# Patient Record
Sex: FEMALE
[De-identification: names, ages, dates, MRNs, and addresses within clinical notes are randomized per-mention and may not be internally consistent; named-entity substitution may affect disease eponyms.]

---

## 2017-02-09 ENCOUNTER — HOSPITAL ENCOUNTER (OUTPATIENT)
Dept: HOSPITAL 53 - M SFHCCLAY | Age: 62
End: 2017-02-09
Attending: FAMILY MEDICINE
Payer: COMMERCIAL

## 2017-02-09 DIAGNOSIS — Z13.220: ICD-10-CM

## 2017-02-09 DIAGNOSIS — Z13.29: ICD-10-CM

## 2017-02-09 DIAGNOSIS — Z13.0: Primary | ICD-10-CM

## 2017-02-09 LAB
ALBUMIN SERPL BCG-MCNC: 3.8 GM/DL (ref 3.2–5.2)
ALBUMIN/GLOB SERPL: 1.06 {RATIO} (ref 1–1.93)
ALP SERPL-CCNC: 66 U/L (ref 45–117)
ALT SERPL W P-5'-P-CCNC: 26 U/L (ref 12–78)
ANION GAP SERPL CALC-SCNC: 10 MEQ/L (ref 8–16)
AST SERPL-CCNC: 18 U/L (ref 15–37)
BASOPHILS # BLD AUTO: 0.1 K/MM3 (ref 0–0.2)
BASOPHILS NFR BLD AUTO: 1.1 % (ref 0–1)
BILIRUB SERPL-MCNC: 0.6 MG/DL (ref 0.2–1)
BUN SERPL-MCNC: 15 MG/DL (ref 7–18)
CALCIUM SERPL-MCNC: 8.7 MG/DL (ref 8.8–10.2)
CHLORIDE SERPL-SCNC: 104 MEQ/L (ref 98–107)
CHOLEST SERPL-MCNC: 216 MG/DL (ref ?–200)
CO2 SERPL-SCNC: 28 MEQ/L (ref 21–32)
CREAT SERPL-MCNC: 0.75 MG/DL (ref 0.55–1.02)
EOSINOPHIL # BLD AUTO: 0.2 K/MM3 (ref 0–0.5)
EOSINOPHIL NFR BLD AUTO: 3.4 % (ref 0–3)
ERYTHROCYTE [DISTWIDTH] IN BLOOD BY AUTOMATED COUNT: 12 % (ref 11.5–14.5)
GFR SERPL CREATININE-BSD FRML MDRD: > 60 ML/MIN/{1.73_M2} (ref 45–?)
GLUCOSE SERPL-MCNC: 86 MG/DL (ref 80–110)
LARGE UNSTAINED CELL #: 0.2 K/MM3 (ref 0–0.4)
LARGE UNSTAINED CELL %: 2.9 % (ref 0–4)
LYMPHOCYTES # BLD AUTO: 1.6 K/MM3 (ref 1.5–4.5)
LYMPHOCYTES NFR BLD AUTO: 23.6 % (ref 24–44)
MCH RBC QN AUTO: 28.5 PG (ref 27–33)
MCHC RBC AUTO-ENTMCNC: 32.4 G/DL (ref 32–36.5)
MCV RBC AUTO: 87.9 FL (ref 80–96)
MONOCYTES # BLD AUTO: 0.4 K/MM3 (ref 0–0.8)
MONOCYTES NFR BLD AUTO: 5.8 % (ref 0–5)
NEUTROPHILS # BLD AUTO: 4.2 K/MM3 (ref 1.8–7.7)
NEUTROPHILS NFR BLD AUTO: 63.1 % (ref 36–66)
PLATELET # BLD AUTO: 315 K/MM3 (ref 150–450)
POTASSIUM SERPL-SCNC: 4.3 MEQ/L (ref 3.5–5.1)
PROT SERPL-MCNC: 7.4 GM/DL (ref 6.4–8.2)
SODIUM SERPL-SCNC: 142 MEQ/L (ref 136–145)
TRIGL SERPL-MCNC: 204 MG/DL (ref ?–150)
WBC # BLD AUTO: 6.6 K/MM3 (ref 4–10)

## 2017-03-30 ENCOUNTER — HOSPITAL ENCOUNTER (OUTPATIENT)
Dept: HOSPITAL 53 - M OPP | Age: 62
End: 2017-03-30
Attending: SURGERY
Payer: COMMERCIAL

## 2017-03-30 VITALS — DIASTOLIC BLOOD PRESSURE: 84 MMHG | SYSTOLIC BLOOD PRESSURE: 137 MMHG

## 2017-03-30 VITALS — BODY MASS INDEX: 29.71 KG/M2 | HEIGHT: 64 IN | WEIGHT: 174 LBS

## 2017-03-30 DIAGNOSIS — R12: ICD-10-CM

## 2017-03-30 DIAGNOSIS — Z92.3: ICD-10-CM

## 2017-03-30 DIAGNOSIS — Z79.899: ICD-10-CM

## 2017-03-30 DIAGNOSIS — Z12.11: Primary | ICD-10-CM

## 2017-03-30 DIAGNOSIS — Z98.0: ICD-10-CM

## 2017-03-30 DIAGNOSIS — K57.30: ICD-10-CM

## 2017-03-30 DIAGNOSIS — Z85.3: ICD-10-CM

## 2017-03-30 PROCEDURE — 99156 MOD SED OTH PHYS/QHP 5/>YRS: CPT

## 2017-03-30 NOTE — ROOR
________________________________________________________________________________

Patient Name: Hien Sam            Procedure Date: 3/30/2017 10:59 AM

MRN: [id number]                          Account Number: [id number]

YOB: 1955               Age: 61

Room: [facility]                            Gender: Female

Note Status: Finalized                 

________________________________________________________________________________

 

Procedure:           Colonoscopy

Indications:         Screening for colorectal malignant neoplasm

Providers:           Leo J. Gosselin Jr, MD

Referring MD:        BALAJI WILSON DO

Requesting Provider: 

Medicines:           Propofol per Anesthesia

Complications:       No immediate complications.

________________________________________________________________________________

Procedure:           Pre-Anesthesia Assessment:

                     - Prior to the procedure, a History and Physical was 

                     performed, and patient medications and allergies were 

                     reviewed. The patient is competent. The risks and 

                     benefits of the procedure and the sedation options and 

                     risks were discussed with the patient. All questions were 

                     answered and informed consent was obtained. Patient 

                     identification and proposed procedure were verified by 

                     the physician and the nurse in the pre-procedure area and 

                     in the procedure room. Mental Status Examination: alert 

                     and oriented. Airway Examination: normal oropharyngeal 

                     airway and neck mobility. Respiratory Examination: clear 

                     to auscultation. CV Examination: normal. ASA Grade 

                     Assessment: II - A patient with mild systemic disease. 

                     After reviewing the risks and benefits, the patient was 

                     deemed in satisfactory condition to undergo the 

                     procedure. The anesthesia plan was to use moderate 

                     sedation / analgesia (conscious sedation). Immediately 

                     prior to administration of medications, the patient was 

                     re-assessed for adequacy to receive sedatives. The heart 

                     rate, respiratory rate, oxygen saturations, blood 

                     pressure, adequacy of pulmonary ventilation, and response 

                     to care were monitored throughout the procedure. The 

                     physical status of the patient was re-assessed after the 

                     procedure.

                     The Colonoscope was introduced through the anus and 

                     advanced to the cecum, identified by appendiceal orifice 

                     and ileocecal valve. The colonoscopy was performed 

                     without difficulty. The patient tolerated the procedure 

                     well. The quality of the bowel preparation was adequate 

                     and good.

                                                                                

Findings:

     The perianal and digital rectal examinations were normal. Pertinent 

     negatives include normal sphincter tone, no palpable rectal lesions and 

     no anal lesion or abnormality was detected.

     A few small-mouthed diverticula were found in the descending colon and 

     transverse colon.

     The rectum, recto-sigmoid colon, descending colon, transverse colon, 

     ascending colon, cecum, appendiceal orifice, ileocecal valve and 

     anastomosis appeared normal.

                                                                                

Impression:          - Diverticulosis in the descending colon and in the 

                     transverse colon.

                     - The rectum, recto-sigmoid colon, descending colon, 

                     transverse colon, ascending colon, cecum, appendiceal 

                     orifice, ileocecal valve and colonic anastomosis are 

                     normal.

                     - No specimens collected.

Recommendation:      - Discharge patient to home (ambulatory).

                     - Repeat colonoscopy in 10 years for screening purposes.

                                                                                

 

Leo Gosselin MD

_____________________

Leo J Gosselin Jr, MD

3/30/2017 11:13:36 AM

This report has been signed electronically.

Number of Addenda: 0

 

Note Initiated On: 3/30/2017 10:59 AM

Estimated Blood Loss:

     Estimated blood loss: none.

## 2017-08-08 ENCOUNTER — HOSPITAL ENCOUNTER (OUTPATIENT)
Dept: HOSPITAL 53 - M RAD | Age: 62
End: 2017-08-08
Attending: FAMILY MEDICINE
Payer: COMMERCIAL

## 2017-08-08 DIAGNOSIS — Z12.39: Primary | ICD-10-CM

## 2017-08-08 NOTE — REPMRS
Patient History

The patient states she had a clinical breast exam in Aug. 2017.

Patient is postmenopausal and has history of cancer in the right 

breast at age 58.

Family history of unknown cancer in mother at age 48, unknown 

cancer in maternal aunt at age 58, and unknown cancer in maternal

uncle at age 54.

Malignant radio exam breast specimen of the right breast, 

September 23, 2013.  Malignant localization of breast nodule of 

the right breast, September 23, 2013.  Malignant stereotatic 

breast biopsy of the right breast, August 14, 2013.  Radiation 

therapy of the right breast.

Taking tamoxifen for 10 months.

 

Digital Mammo Screening Bilat: August 8, 2017 - Exam #: 

[id number]

Bilateral CC and MLO view(s) were taken.

 

Technologist: Julia Macias, Technologist

Prior study comparison: August 5, 2016, bilateral digital mammo 

screening bilat performed at [facility].  July 7, 2015, bilateral digital mammo screening bilat performed at 

[facility].

 

FINDINGS: There are scattered fibroglandular densities.  There is

no evidence of cancer on this mammogram.  Large coarse benign 

appearing calcifications are present.  No significant changes 

when compared with prior studies.

 

ASSESSMENT: BI-RADS/ACR category 2 mammogram. Benign finding(s).

 

Recommendation

Routine screening mammogram of both breasts in 1 year (for women 

over age 40).

This mammogram was interpreted with the aid of an FDA-approved 

computer-aided dectection system.

 

Electronically Signed By: Chalo Wood MD 08/08/17 [id number]

## 2017-08-16 ENCOUNTER — HOSPITAL ENCOUNTER (OUTPATIENT)
Dept: HOSPITAL 53 - M WHC | Age: 62
End: 2017-08-16
Attending: OBSTETRICS & GYNECOLOGY
Payer: COMMERCIAL

## 2017-08-16 DIAGNOSIS — Z13.820: Primary | ICD-10-CM

## 2017-08-18 NOTE — DEXA
AP SPINE   L1 - L4   1.459   2.1      3.4

LT FEMUR   TOTAL   1.270   2.1      3.1

RT FEMUR   TOTAL   1.258   2.0      3.0

TOTAL BODY   TOTAL

OTHER



DUAL FEMUR FRAX* ASSESSMENT

Risk factors:               Not done.

10 year probability of fracture

Major osteoporotic fracture            %

Hip fracture               %



COMMENTS:

Normal bone densitometry of the spine and hips.

The decreased density of the spine does not represent a significant change.

The increased density of the left hip does not represent a significant change.

The increased density of the right hip does not represent a significant change.

The density of the spine has decreased 3.8% since the initial exam on 05/11/
2011.

The spine density has decreased 1.1% since the most recent exam on 08/05/2015.

The density of the left hip has decreased 2.4% since the initial exam on 05/11/
2011.

The density of the left hip has increased 0.1% since the most recent exam on 08/
05/2015.

The density of the right hip has decreased 2.8% since the initial exam on 05/11/
2011.

The density of the right hip has increased 0.6% since the most recent exam on 08
/05/2015.



FOLLOW-UP:

Recommendation for the next bone density exam: 5 years.
ES

## 2019-02-20 ENCOUNTER — HOSPITAL ENCOUNTER (OUTPATIENT)
Dept: HOSPITAL 53 - M RAD | Age: 64
End: 2019-02-20
Payer: COMMERCIAL

## 2019-02-20 DIAGNOSIS — R92.2: Primary | ICD-10-CM

## 2019-02-20 DIAGNOSIS — Z85.3: ICD-10-CM

## 2019-02-20 DIAGNOSIS — Z92.3: ICD-10-CM

## 2019-02-25 NOTE — REP
Bilateral breast MRI study without and with IV gadolinium:

 

History: History of ductal carcinoma in situ.  Multifocal tumor.  Status post

lumpectomy and radiation therapy in 2013, comparison mammography August 8, 2017.

 

Technique:

 

3 Kalyn MRI imaging was performed with a dedicated breast coil.  Axial, coronal,

and sagittal T1 and T2-weighted scans were obtained with and without fat

saturation in the usual fashion.  The study includes dynamically acquired post

gadolinium enhanced imaging subtraction imaging.  Maximal intensity projection

and multiplanar re-formation imaging is included as well.  The study was

interpreted with the aid of CertessD, an FDA approved computer-aided detection

(CAD) software program, on a dedicated breast MRI work station.

 

The gadolinium enhancement dose is 15 mL  of intravenous ProHance.

 

Findings: T2-weighted scans show no evidence of axillary adenopathy on either

side.  There are post-treatment fibrotic changes in the lateral and superior

aspect of the right breast.  There is a magnetic field susceptibility artifact in

the right breast from the needle biopsy marker clip.  There is also a similar

artifact in the left breast.  High-resolution T1 and T2-weighted imaging shows no

suspicious morphologic abnormality on either side.  Dynamically acquired

sequential post gadolinium enhanced images show no suspicious focus of

enhancement and/or washout in either breast to suggest malignancy.  Subtraction

images show no additional abnormality. There is no evidence of chest wall

disease.

 

Impression:

 

BI-RADS category 2 benign bilateral breast MRI findings.

 

 

Electronically Signed by

Sherman Oneal MD 02/25/2019 01:27 P

## 2019-09-13 ENCOUNTER — HOSPITAL ENCOUNTER (OUTPATIENT)
Dept: HOSPITAL 53 - M WHC | Age: 64
End: 2019-09-13
Attending: OBSTETRICS & GYNECOLOGY
Payer: COMMERCIAL

## 2019-09-13 DIAGNOSIS — M85.852: ICD-10-CM

## 2019-09-13 DIAGNOSIS — M85.851: ICD-10-CM

## 2019-09-13 DIAGNOSIS — M85.88: ICD-10-CM

## 2019-09-13 DIAGNOSIS — Z13.820: Primary | ICD-10-CM

## 2020-02-27 ENCOUNTER — HOSPITAL ENCOUNTER (OUTPATIENT)
Dept: HOSPITAL 53 - M SFHCCLAY | Age: 65
End: 2020-02-27
Attending: FAMILY MEDICINE
Payer: COMMERCIAL

## 2020-02-27 DIAGNOSIS — J02.9: Primary | ICD-10-CM

## 2021-08-16 ENCOUNTER — HOSPITAL ENCOUNTER (OUTPATIENT)
Dept: HOSPITAL 53 - M SFHCCLAY | Age: 66
End: 2021-08-16
Attending: FAMILY MEDICINE
Payer: MEDICARE

## 2021-08-16 DIAGNOSIS — Z23: ICD-10-CM

## 2021-08-16 DIAGNOSIS — E78.2: Primary | ICD-10-CM

## 2021-08-16 LAB
ALBUMIN SERPL BCG-MCNC: 4.1 GM/DL (ref 3.2–5.2)
ALT SERPL W P-5'-P-CCNC: 32 U/L (ref 12–78)
BASOPHILS # BLD AUTO: 0.1 10^3/UL (ref 0–0.2)
BASOPHILS NFR BLD AUTO: 1.9 % (ref 0–1)
BILIRUB SERPL-MCNC: 0.7 MG/DL (ref 0.2–1)
BUN SERPL-MCNC: 12 MG/DL (ref 7–18)
CALCIUM SERPL-MCNC: 9.7 MG/DL (ref 8.8–10.2)
CHLORIDE SERPL-SCNC: 108 MEQ/L (ref 98–107)
CHOLEST SERPL-MCNC: 189 MG/DL (ref ?–200)
CHOLEST/HDLC SERPL: 3.31 {RATIO} (ref ?–5)
CO2 SERPL-SCNC: 30 MEQ/L (ref 21–32)
CREAT SERPL-MCNC: 0.74 MG/DL (ref 0.55–1.3)
EOSINOPHIL # BLD AUTO: 0.3 10^3/UL (ref 0–0.5)
EOSINOPHIL NFR BLD AUTO: 3.8 % (ref 0–3)
GFR SERPL CREATININE-BSD FRML MDRD: > 60 ML/MIN/{1.73_M2} (ref 45–?)
GLUCOSE SERPL-MCNC: 91 MG/DL (ref 70–100)
HCT VFR BLD AUTO: 40.2 % (ref 36–47)
HDLC SERPL-MCNC: 57 MG/DL (ref 40–?)
HGB BLD-MCNC: 13.1 G/DL (ref 12–15.5)
LDLC SERPL CALC-MCNC: 112 MG/DL (ref ?–100)
LYMPHOCYTES # BLD AUTO: 1.9 10^3/UL (ref 1.5–5)
LYMPHOCYTES NFR BLD AUTO: 26.8 % (ref 24–44)
MCH RBC QN AUTO: 28.9 PG (ref 27–33)
MCHC RBC AUTO-ENTMCNC: 32.6 G/DL (ref 32–36.5)
MCV RBC AUTO: 88.7 FL (ref 80–96)
MONOCYTES # BLD AUTO: 0.7 10^3/UL (ref 0–0.8)
MONOCYTES NFR BLD AUTO: 9.9 % (ref 2–8)
NEUTROPHILS # BLD AUTO: 4.1 10^3/UL (ref 1.5–8.5)
NEUTROPHILS NFR BLD AUTO: 57.5 % (ref 36–66)
NONHDLC SERPL-MCNC: 132 MG/DL
PLATELET # BLD AUTO: 352 10^3/UL (ref 150–450)
POTASSIUM SERPL-SCNC: 4.9 MEQ/L (ref 3.5–5.1)
PROT SERPL-MCNC: 7.4 GM/DL (ref 6.4–8.2)
RBC # BLD AUTO: 4.53 10^6/UL (ref 4–5.4)
SODIUM SERPL-SCNC: 140 MEQ/L (ref 136–145)
TRIGL SERPL-MCNC: 101 MG/DL (ref ?–150)
WBC # BLD AUTO: 7.2 10^3/UL (ref 4–10)

## 2021-08-16 PROCEDURE — 90732 PPSV23 VACC 2 YRS+ SUBQ/IM: CPT

## 2021-08-16 PROCEDURE — 80061 LIPID PANEL: CPT

## 2021-08-16 PROCEDURE — 80053 COMPREHEN METABOLIC PANEL: CPT

## 2021-08-16 PROCEDURE — 85025 COMPLETE CBC W/AUTO DIFF WBC: CPT

## 2021-09-29 ENCOUNTER — HOSPITAL ENCOUNTER (OUTPATIENT)
Dept: HOSPITAL 53 - M WHC | Age: 66
End: 2021-09-29
Attending: OBSTETRICS & GYNECOLOGY
Payer: MEDICARE

## 2021-09-29 DIAGNOSIS — M81.0: Primary | ICD-10-CM

## 2021-09-29 NOTE — DEXAMM
INDICATION:

SCREEN FOR OSTEOPOROSIS.



COMPARISON:

Comparison study September 13, 2019.



TECHNIQUE:

Bone density was measured using dual-energy x-ray absorptionmetry (DEXA).



FINDINGS:

AP SPINE L1-L4

BMD 1.489 g/cm2

Young Adult T-Score 2.4

Age Matched Z-Score 4.0.



LT FEMUR, TOTAL

BMD 1.279 g/cm2

Young Adult T-Score 2.2

Age Matched Z-Score 3.3.



LT NECK

BMD 1.184 g/cm2

Young Adult T-Score 1.1

Age Matched Z-Score 2.5.



RT FEMUR, TOTAL

BMD 1.216 g/cm2

Young Adult T-Score 1.7

Age Matched Z-Score 2.8.



RT NECK

BMD 1.082 g/cm2

Young Adult T-Score 0.3

Age Matched Z-Score 1.8.



IMPRESSION:

There is normal bone density of the spine.



There is normal bone density of the left hip.





There is normal bone density of the right hip.



The density of the spine has decreased 1.8% since the initial exam on May 11, 2011.





The density of the spine increased 1.6% since most recent exam on September 13, 2019.





The density of the left hip has decreased 1.7% since initial exam on May 11, 2011.





The density of the left hip has increased 1.0% since most recent exam on September 13, 2019.





The density of the right hip has decreased 6.0% since the initial exam on May 11, 2011.





The density of the right hip has decreased 0.5% since the most recent exam on

September 13, 2019.



FOLLOW-UP:

Recommendation for the next bone density exam: 5-10 years.





<Electronically signed by Moo Oneal > 09/29/21 [id number]

## 2022-09-09 ENCOUNTER — HOSPITAL ENCOUNTER (OUTPATIENT)
Dept: HOSPITAL 53 - M WHC | Age: 67
End: 2022-09-09
Attending: OBSTETRICS & GYNECOLOGY
Payer: COMMERCIAL

## 2022-09-09 DIAGNOSIS — Z53.9: Primary | ICD-10-CM

## 2023-03-02 ENCOUNTER — HOSPITAL ENCOUNTER (OUTPATIENT)
Dept: HOSPITAL 53 - M SFHCCLAY | Age: 68
End: 2023-03-02
Attending: FAMILY MEDICINE
Payer: MEDICARE

## 2023-03-02 DIAGNOSIS — R10.11: ICD-10-CM

## 2023-03-02 DIAGNOSIS — K21.9: Primary | ICD-10-CM

## 2023-03-02 LAB
ALBUMIN SERPL BCG-MCNC: 4.1 G/DL (ref 3.2–5.2)
ALP SERPL-CCNC: 93 U/L (ref 46–116)
ALT SERPL W P-5'-P-CCNC: 29 U/L (ref 7–40)
AST SERPL-CCNC: 24 U/L (ref ?–34)
BASOPHILS # BLD AUTO: 0.1 10^3/UL (ref 0–0.2)
BASOPHILS NFR BLD AUTO: 2 % (ref 0–1)
BILIRUB SERPL-MCNC: 1 MG/DL (ref 0.3–1.2)
BUN SERPL-MCNC: 15 MG/DL (ref 9–23)
CALCIUM SERPL-MCNC: 9.4 MG/DL (ref 8.3–10.6)
CHLORIDE SERPL-SCNC: 104 MMOL/L (ref 98–107)
CO2 SERPL-SCNC: 31 MMOL/L (ref 20–31)
CREAT SERPL-MCNC: 0.76 MG/DL (ref 0.55–1.3)
EOSINOPHIL # BLD AUTO: 0.2 10^3/UL (ref 0–0.5)
EOSINOPHIL NFR BLD AUTO: 4 % (ref 0–3)
GFR SERPL CREATININE-BSD FRML MDRD: > 60 ML/MIN/{1.73_M2} (ref 45–?)
GLUCOSE SERPL-MCNC: 81 MG/DL (ref 74–106)
HCT VFR BLD AUTO: 40 % (ref 36–47)
HGB BLD-MCNC: 13 G/DL (ref 12–15.5)
LYMPHOCYTES # BLD AUTO: 1.6 10^3/UL (ref 1.5–5)
LYMPHOCYTES NFR BLD AUTO: 26.3 % (ref 24–44)
MCH RBC QN AUTO: 28.9 PG (ref 27–33)
MCHC RBC AUTO-ENTMCNC: 32.5 G/DL (ref 32–36.5)
MCV RBC AUTO: 88.9 FL (ref 80–96)
MONOCYTES # BLD AUTO: 0.7 10^3/UL (ref 0–0.8)
MONOCYTES NFR BLD AUTO: 11.2 % (ref 2–8)
NEUTROPHILS # BLD AUTO: 3.4 10^3/UL (ref 1.5–8.5)
NEUTROPHILS NFR BLD AUTO: 56.3 % (ref 36–66)
PLATELET # BLD AUTO: 366 10^3/UL (ref 150–450)
POTASSIUM SERPL-SCNC: 4.6 MMOL/L (ref 3.5–5.1)
PROT SERPL-MCNC: 7.3 G/DL (ref 5.7–8.2)
RBC # BLD AUTO: 4.5 10^6/UL (ref 4–5.4)
SODIUM SERPL-SCNC: 140 MMOL/L (ref 136–145)
WBC # BLD AUTO: 6 10^3/UL (ref 4–10)

## 2023-10-02 ENCOUNTER — HOSPITAL ENCOUNTER (OUTPATIENT)
Dept: HOSPITAL 53 - M WHC | Age: 68
End: 2023-10-02
Attending: OBSTETRICS & GYNECOLOGY
Payer: MEDICARE

## 2023-10-02 DIAGNOSIS — Z13.820: Primary | ICD-10-CM

## 2023-10-02 DIAGNOSIS — Z78.0: ICD-10-CM

## 2024-08-20 ENCOUNTER — HOSPITAL ENCOUNTER (OUTPATIENT)
Dept: HOSPITAL 53 - M SFHCCLAY | Age: 69
End: 2024-08-20
Attending: FAMILY MEDICINE
Payer: MEDICARE

## 2024-08-20 DIAGNOSIS — E78.2: ICD-10-CM

## 2024-08-20 DIAGNOSIS — K21.9: Primary | ICD-10-CM

## 2024-08-20 LAB
ALBUMIN SERPL BCG-MCNC: 4.1 G/DL (ref 3.2–5.2)
ALP SERPL-CCNC: 108 U/L (ref 46–116)
ALT SERPL W P-5'-P-CCNC: 33 U/L (ref 7–40)
AST SERPL-CCNC: 24 U/L (ref ?–34)
BILIRUB SERPL-MCNC: 1 MG/DL (ref 0.3–1.2)
BUN SERPL-MCNC: 13 MG/DL (ref 9–23)
CALCIUM SERPL-MCNC: 9.5 MG/DL (ref 8.3–10.6)
CHLORIDE SERPL-SCNC: 107 MMOL/L (ref 98–107)
CHOLEST SERPL-MCNC: 178 MG/DL (ref ?–200)
CHOLEST/HDLC SERPL: 3.59 {RATIO} (ref ?–5)
CO2 SERPL-SCNC: 24 MMOL/L (ref 20–31)
CREAT SERPL-MCNC: 0.67 MG/DL (ref 0.55–1.3)
GFR SERPL CREATININE-BSD FRML MDRD: > 60 ML/MIN/{1.73_M2} (ref 45–?)
GLUCOSE SERPL-MCNC: 97 MG/DL (ref 74–106)
HCT VFR BLD AUTO: 43 % (ref 36–47)
HDLC SERPL-MCNC: 49.5 MG/DL (ref 40–?)
HGB BLD-MCNC: 14.1 G/DL (ref 12–15.5)
LDLC SERPL CALC-MCNC: 96.9 MG/DL (ref ?–100)
MAGNESIUM SERPL-MCNC: 2 MG/DL (ref 1.8–2.4)
MCH RBC QN AUTO: 28.8 PG (ref 27–33)
MCHC RBC AUTO-ENTMCNC: 32.8 G/DL (ref 32–36.5)
MCV RBC AUTO: 87.9 FL (ref 80–96)
NONHDLC SERPL-MCNC: 128.5 MG/DL
PLATELET # BLD AUTO: 377 10^3/UL (ref 150–450)
POTASSIUM SERPL-SCNC: 4.3 MMOL/L (ref 3.5–5.1)
PROT SERPL-MCNC: 7.4 G/DL (ref 5.7–8.2)
RBC # BLD AUTO: 4.89 10^6/UL (ref 4–5.4)
SODIUM SERPL-SCNC: 138 MMOL/L (ref 136–145)
TRIGL SERPL-MCNC: 158 MG/DL (ref ?–150)
WBC # BLD AUTO: 7.3 10^3/UL (ref 4–10)

## 2024-12-11 ENCOUNTER — HOSPITAL ENCOUNTER (OUTPATIENT)
Dept: HOSPITAL 53 - M CLY | Age: 69
End: 2024-12-11
Payer: MEDICARE

## 2024-12-11 DIAGNOSIS — R05.1: Primary | ICD-10-CM
